# Patient Record
Sex: FEMALE | URBAN - METROPOLITAN AREA
[De-identification: names, ages, dates, MRNs, and addresses within clinical notes are randomized per-mention and may not be internally consistent; named-entity substitution may affect disease eponyms.]

---

## 2024-01-08 ENCOUNTER — HOSPITAL ENCOUNTER (EMERGENCY)
Facility: CLINIC | Age: 69
Discharge: STILL ADMITTED OR ADMINISTRATIVE | End: 2024-01-08
Attending: EMERGENCY MEDICINE

## 2024-01-08 ASSESSMENT — ACTIVITIES OF DAILY LIVING (ADL)
ADLS_ACUITY_SCORE: 35

## 2024-01-08 NOTE — ED NOTES
Bed: ED06  Expected date:   Expected time:   Means of arrival:   Comments:  [phone]  68 F fall/hip pain  [id number]